# Patient Record
Sex: MALE | Race: WHITE | NOT HISPANIC OR LATINO | URBAN - METROPOLITAN AREA
[De-identification: names, ages, dates, MRNs, and addresses within clinical notes are randomized per-mention and may not be internally consistent; named-entity substitution may affect disease eponyms.]

---

## 2024-01-19 ENCOUNTER — LAB OUTSIDE AN ENCOUNTER (OUTPATIENT)
Dept: URBAN - METROPOLITAN AREA CLINIC 19 | Facility: CLINIC | Age: 44
End: 2024-01-19

## 2024-01-19 ENCOUNTER — OFFICE VISIT (OUTPATIENT)
Dept: URBAN - METROPOLITAN AREA CLINIC 19 | Facility: CLINIC | Age: 44
End: 2024-01-19
Payer: COMMERCIAL

## 2024-01-19 ENCOUNTER — DASHBOARD ENCOUNTERS (OUTPATIENT)
Age: 44
End: 2024-01-19

## 2024-01-19 VITALS
HEIGHT: 76 IN | WEIGHT: 315 LBS | DIASTOLIC BLOOD PRESSURE: 90 MMHG | BODY MASS INDEX: 38.36 KG/M2 | HEART RATE: 81 BPM | SYSTOLIC BLOOD PRESSURE: 140 MMHG | TEMPERATURE: 97.1 F

## 2024-01-19 DIAGNOSIS — Z12.11 SCREEN FOR COLON CANCER: ICD-10-CM

## 2024-01-19 DIAGNOSIS — R10.13 EPIGASTRIC PAIN: ICD-10-CM

## 2024-01-19 PROCEDURE — 99243 OFF/OP CNSLTJ NEW/EST LOW 30: CPT | Performed by: INTERNAL MEDICINE

## 2024-01-19 PROCEDURE — 99203 OFFICE O/P NEW LOW 30 MIN: CPT | Performed by: INTERNAL MEDICINE

## 2024-01-19 RX ORDER — SODIUM, POTASSIUM,MAG SULFATES 17.5-3.13G
AS DIRECTED SOLUTION, RECONSTITUTED, ORAL ORAL
OUTPATIENT
Start: 2024-01-19

## 2024-01-19 RX ORDER — FAMOTIDINE 40 MG/1
1 TABLET AT BEDTIME TABLET, FILM COATED ORAL ONCE A DAY
Status: ACTIVE | COMMUNITY

## 2024-01-19 RX ORDER — LOSARTAN POTASSIUM 100 MG/1
1 TABLET TABLET, FILM COATED ORAL ONCE A DAY
Status: ACTIVE | COMMUNITY

## 2024-01-19 RX ORDER — MELOXICAM 15 MG/1
1 TABLET TABLET ORAL ONCE A DAY
Status: ACTIVE | COMMUNITY

## 2024-01-19 RX ORDER — OMEPRAZOLE 40 MG/1
1 CAPSULE 30 MINUTES BEFORE MORNING MEAL CAPSULE, DELAYED RELEASE ORAL ONCE A DAY
Status: ACTIVE | COMMUNITY

## 2024-01-19 RX ORDER — FENOFIBRATE 160 MG/1
1 TABLET TABLET ORAL ONCE A DAY
Status: ACTIVE | COMMUNITY

## 2024-01-19 NOTE — HPI-TODAY'S VISIT:
Patient presents as a referral from Juana Ortiz (RN) from [facility] ([place], [place]) to schedule an EGD/colonoscopy since patient has [facility]. A copy of this note will be sent to the referring provider. Patient is mainly here for a screening colonoscopy, but he has been on omeprazole for 10 years, which has controlled his heartburn. He also takes famotidine. On Ozempic for weight loss, but he may be switching to Mounjaro. He was told that he had fatty liver.

## 2024-01-19 NOTE — PHYSICAL EXAM CARDIOVASCULAR:
Tonia/[facility] called re: Oxygen order; HME still needs -   Liter flow & frequency  Office visit notes  saturation testing    Please send to fax #[phone] no edema,  no murmurs,  regular rate and rhythm

## 2024-02-16 ENCOUNTER — COL/EGD (OUTPATIENT)
Dept: URBAN - METROPOLITAN AREA MEDICAL CENTER 25 | Facility: MEDICAL CENTER | Age: 44
End: 2024-02-16
Payer: COMMERCIAL

## 2024-02-16 DIAGNOSIS — Z12.11 COLON CANCER SCREENING: ICD-10-CM

## 2024-02-16 PROCEDURE — G0121 COLON CA SCRN NOT HI RSK IND: HCPCS | Performed by: INTERNAL MEDICINE

## 2024-02-16 RX ORDER — MELOXICAM 15 MG/1
1 TABLET TABLET ORAL ONCE A DAY
Status: ACTIVE | COMMUNITY

## 2024-02-16 RX ORDER — FAMOTIDINE 40 MG/1
1 TABLET AT BEDTIME TABLET, FILM COATED ORAL ONCE A DAY
Status: ACTIVE | COMMUNITY

## 2024-02-16 RX ORDER — FENOFIBRATE 160 MG/1
1 TABLET TABLET ORAL ONCE A DAY
Status: ACTIVE | COMMUNITY

## 2024-02-16 RX ORDER — SODIUM, POTASSIUM,MAG SULFATES 17.5-3.13G
AS DIRECTED SOLUTION, RECONSTITUTED, ORAL ORAL
Status: ACTIVE | COMMUNITY
Start: 2024-01-19

## 2024-02-16 RX ORDER — LOSARTAN POTASSIUM 100 MG/1
1 TABLET TABLET, FILM COATED ORAL ONCE A DAY
Status: ACTIVE | COMMUNITY

## 2024-02-16 RX ORDER — OMEPRAZOLE 40 MG/1
1 CAPSULE 30 MINUTES BEFORE MORNING MEAL CAPSULE, DELAYED RELEASE ORAL ONCE A DAY
Status: ACTIVE | COMMUNITY